# Patient Record
Sex: FEMALE | Race: BLACK OR AFRICAN AMERICAN | URBAN - METROPOLITAN AREA
[De-identification: names, ages, dates, MRNs, and addresses within clinical notes are randomized per-mention and may not be internally consistent; named-entity substitution may affect disease eponyms.]

---

## 2023-06-29 ENCOUNTER — WEB ENCOUNTER (OUTPATIENT)
Dept: URBAN - METROPOLITAN AREA CLINIC 92 | Facility: CLINIC | Age: 63
End: 2023-06-29

## 2023-06-29 ENCOUNTER — DASHBOARD ENCOUNTERS (OUTPATIENT)
Age: 63
End: 2023-06-29

## 2023-06-29 ENCOUNTER — LAB OUTSIDE AN ENCOUNTER (OUTPATIENT)
Dept: URBAN - METROPOLITAN AREA CLINIC 92 | Facility: CLINIC | Age: 63
End: 2023-06-29

## 2023-06-29 ENCOUNTER — OFFICE VISIT (OUTPATIENT)
Dept: URBAN - METROPOLITAN AREA CLINIC 92 | Facility: CLINIC | Age: 63
End: 2023-06-29
Payer: MEDICARE

## 2023-06-29 VITALS
SYSTOLIC BLOOD PRESSURE: 111 MMHG | DIASTOLIC BLOOD PRESSURE: 70 MMHG | HEART RATE: 63 BPM | WEIGHT: 184 LBS | TEMPERATURE: 98.1 F | BODY MASS INDEX: 25.76 KG/M2 | HEIGHT: 71 IN

## 2023-06-29 DIAGNOSIS — Z12.11 COLON CANCER SCREENING: ICD-10-CM

## 2023-06-29 DIAGNOSIS — J44.9 COPD WITHOUT EXACERBATION: ICD-10-CM

## 2023-06-29 PROBLEM — 13645005: Status: ACTIVE | Noted: 2023-06-29

## 2023-06-29 PROCEDURE — 99203 OFFICE O/P NEW LOW 30 MIN: CPT | Performed by: INTERNAL MEDICINE

## 2023-06-29 RX ORDER — AMMONIUM LACTATE 12 G/100G
APPLY TOPICALLY 2 TIMES EVERY DAY AS NEEDED LOTION TOPICAL
Qty: 400 GRAM | Refills: 1 | Status: ACTIVE | COMMUNITY

## 2023-06-29 RX ORDER — OMEPRAZOLE 20 MG/1
CAPSULE, DELAYED RELEASE ORAL
Qty: 90 CAPSULE | Status: ACTIVE | COMMUNITY

## 2023-06-29 RX ORDER — CICLOPIROX OLAMINE 7.7 MG/G
CREAM TOPICAL
Qty: 15 GRAM | Status: ACTIVE | COMMUNITY

## 2023-06-29 RX ORDER — METFORMIN HCL 500 MG/1
TABLET ORAL
Qty: 180 TABLET | Status: ACTIVE | COMMUNITY

## 2023-06-29 RX ORDER — BUSPIRONE HYDROCHLORIDE 15 MG/1
TAKE 1 TABLET BY MOUTH TWICE A DAY TABLET ORAL
Qty: 60 EACH | Refills: 0 | Status: ACTIVE | COMMUNITY

## 2023-06-29 RX ORDER — GABAPENTIN 600 MG/1
TABLET ORAL
Qty: 270 TABLET | Status: ACTIVE | COMMUNITY

## 2023-06-29 RX ORDER — TRAZODONE HYDROCHLORIDE 100 MG/1
TABLET ORAL
Qty: 30 TABLET | Status: ACTIVE | COMMUNITY

## 2023-06-29 RX ORDER — OXYBUTYNIN CHLORIDE 5 MG/1
TAKE 1 TABLET BY MOUTH THREE TIMES A DAY TABLET ORAL
Qty: 90 EACH | Refills: 0 | Status: DISCONTINUED | COMMUNITY

## 2023-06-29 RX ORDER — DAPAGLIFLOZIN 5 MG/1
TABLET, FILM COATED ORAL
Qty: 90 TABLET | Status: ACTIVE | COMMUNITY

## 2023-06-29 RX ORDER — POLYETHYLENE GLYCOL 3350, SODIUM CHLORIDE, SODIUM BICARBONATE, POTASSIUM CHLORIDE 420; 11.2; 5.72; 1.48 G/4L; G/4L; G/4L; G/4L
4000 ML POWDER, FOR SOLUTION ORAL AS DIRECTED
Qty: 4000 ML | Refills: 0 | OUTPATIENT
Start: 2023-06-29 | End: 2023-06-30

## 2023-06-29 RX ORDER — NALOXONE HYDROCHLORIDE 4 MG/.1ML
SPRAY, METERED NASAL
Qty: 2 EACH | Status: ACTIVE | COMMUNITY

## 2023-06-29 RX ORDER — DICLOFENAC SODIUM 10 MG/G
GEL TOPICAL
Qty: 1400 GRAM | Status: ACTIVE | COMMUNITY

## 2023-06-29 RX ORDER — OLANZAPINE 15 MG/1
TABLET, FILM COATED ORAL
Qty: 90 TABLET | Status: ACTIVE | COMMUNITY

## 2023-06-29 RX ORDER — TIOTROPIUM BROMIDE INHALATION SPRAY 3.12 UG/1
TAKE 1 PUFF BY MOUTH TWICE A DAY SPRAY, METERED RESPIRATORY (INHALATION)
Qty: 12 GRAM | Refills: 0 | Status: ACTIVE | COMMUNITY

## 2023-06-29 RX ORDER — METHOCARBAMOL TABLETS 500 MG/1
TAKE 1 TO 2 TABLETS BY MOUTH THREE TIMES DAILY AS NEEDED TABLET, COATED ORAL
Qty: 180 EACH | Refills: 0 | Status: ACTIVE | COMMUNITY

## 2023-06-29 RX ORDER — INSULIN GLARGINE 100 [IU]/ML
INJECT 25 UNITS BELOW THE SKIN AT BEDTIME INJECTION, SOLUTION SUBCUTANEOUS
Qty: 15 MILLILITER | Refills: 3 | Status: ACTIVE | COMMUNITY

## 2023-06-29 RX ORDER — FLUTICASONE PROPIONATE 50 UG/1
SPRAY 1 - 2 SPRAY BY INTRANASAL ROUTE EVERY DAY IN EACH NOSTRIL AS NEEDED SPRAY, METERED NASAL
Qty: 16 GRAM | Refills: 1 | Status: ACTIVE | COMMUNITY

## 2023-06-29 RX ORDER — SOLIFENACIN SUCCINATE 10 MG/1
TABLET, FILM COATED, EXTENDED RELEASE ORAL
Qty: 90 TABLET | Status: ACTIVE | COMMUNITY

## 2023-06-29 RX ORDER — ONDANSETRON 4 MG/1
2 TABLETS TABLET, FILM COATED ORAL
Qty: 2 TABLETS | Refills: 0 | OUTPATIENT
Start: 2023-06-29

## 2023-06-29 RX ORDER — SIMVASTATIN 20 MG/1
TABLET, FILM COATED ORAL
Qty: 90 TABLET | Status: ACTIVE | COMMUNITY

## 2023-06-29 RX ORDER — LISINOPRIL 10 MG/1
TABLET ORAL
Qty: 90 TABLET | Status: ACTIVE | COMMUNITY

## 2023-06-29 RX ORDER — OXYCODONE HYDROCHLORIDE AND ACETAMINOPHEN 10; 325 MG/1; MG/1
TABLET ORAL
Qty: 90 TABLET | Status: ACTIVE | COMMUNITY

## 2023-06-29 RX ORDER — INSULIN ASPART 100 [IU]/ML
INJECT SC 10 UNITS PRE-BREAKFAST, 10 UNITS PRE-LUNCH, AND 10 UNITS PRE-SUPPER INJECTION, SOLUTION INTRAVENOUS; SUBCUTANEOUS
Qty: 15 MILLILITER | Refills: 5 | Status: ACTIVE | COMMUNITY

## 2023-06-29 NOTE — HPI-TODAY'S VISIT:
This is a 62-year-old female referred by Dr Renny Cottrell for GI consultation for colon cancer screening and a copy will be sent to the referring provider.  Upon review of the chart, she had an ERCP done by Dr. Auguste back in 2017 due to elevated transaminases and a suspected bile duct stone.  She had a stent placed at the time and he recommended cholecystectomy. Pt had a colonoscopy in 2012- normal- does not remember where. Pt had her gb out she thinks in 2017 or 2018.  No constipation or diarrhea. NO fam hx of colon cancer. No gerd or anemia. Pt has Cerebral palsy and is disabled.

## 2024-08-08 ENCOUNTER — OFFICE VISIT (OUTPATIENT)
Dept: URBAN - METROPOLITAN AREA CLINIC 92 | Facility: CLINIC | Age: 64
End: 2024-08-08
Payer: MEDICARE

## 2024-08-08 ENCOUNTER — LAB OUTSIDE AN ENCOUNTER (OUTPATIENT)
Dept: URBAN - METROPOLITAN AREA CLINIC 92 | Facility: CLINIC | Age: 64
End: 2024-08-08

## 2024-08-08 DIAGNOSIS — K80.50 CHOLEDOCHOLITHIASIS: ICD-10-CM

## 2024-08-08 DIAGNOSIS — J44.9 COPD WITHOUT EXACERBATION: ICD-10-CM

## 2024-08-08 DIAGNOSIS — Z12.11 COLON CANCER SCREENING: ICD-10-CM

## 2024-08-08 PROCEDURE — 99203 OFFICE O/P NEW LOW 30 MIN: CPT | Performed by: INTERNAL MEDICINE

## 2024-08-08 RX ORDER — OLANZAPINE 15 MG/1
TABLET, FILM COATED ORAL
Qty: 90 TABLET | Status: ACTIVE | COMMUNITY

## 2024-08-08 RX ORDER — DAPAGLIFLOZIN 5 MG/1
TABLET, FILM COATED ORAL
Qty: 90 TABLET | Status: ACTIVE | COMMUNITY

## 2024-08-08 RX ORDER — OMEPRAZOLE 20 MG/1
CAPSULE, DELAYED RELEASE ORAL
Qty: 90 CAPSULE | Status: ACTIVE | COMMUNITY

## 2024-08-08 RX ORDER — CICLOPIROX OLAMINE 7.7 MG/G
CREAM TOPICAL
Qty: 15 GRAM | Status: ACTIVE | COMMUNITY

## 2024-08-08 RX ORDER — INSULIN ASPART 100 [IU]/ML
INJECT SC 10 UNITS PRE-BREAKFAST, 10 UNITS PRE-LUNCH, AND 10 UNITS PRE-SUPPER INJECTION, SOLUTION INTRAVENOUS; SUBCUTANEOUS
Qty: 15 MILLILITER | Refills: 5 | Status: ACTIVE | COMMUNITY

## 2024-08-08 RX ORDER — DICLOFENAC SODIUM 10 MG/G
GEL TOPICAL
Qty: 1400 GRAM | Status: ACTIVE | COMMUNITY

## 2024-08-08 RX ORDER — AMMONIUM LACTATE 12 G/100G
APPLY TOPICALLY 2 TIMES EVERY DAY AS NEEDED LOTION TOPICAL
Qty: 400 GRAM | Refills: 1 | Status: ACTIVE | COMMUNITY

## 2024-08-08 RX ORDER — ONDANSETRON 4 MG/1
2 TABLETS TABLET, FILM COATED ORAL
Qty: 2 TABLETS | Refills: 0 | Status: ACTIVE | COMMUNITY
Start: 2023-06-29

## 2024-08-08 RX ORDER — TRAZODONE HYDROCHLORIDE 100 MG/1
TABLET ORAL
Qty: 30 TABLET | Status: ACTIVE | COMMUNITY

## 2024-08-08 RX ORDER — NALOXONE HYDROCHLORIDE 4 MG/.1ML
SPRAY, METERED NASAL
Qty: 2 EACH | Status: ACTIVE | COMMUNITY

## 2024-08-08 RX ORDER — FLUTICASONE PROPIONATE 50 UG/1
SPRAY 1 - 2 SPRAY BY INTRANASAL ROUTE EVERY DAY IN EACH NOSTRIL AS NEEDED SPRAY, METERED NASAL
Qty: 16 GRAM | Refills: 1 | Status: ACTIVE | COMMUNITY

## 2024-08-08 RX ORDER — METFORMIN HCL 500 MG/1
TABLET ORAL
Qty: 180 TABLET | Status: ACTIVE | COMMUNITY

## 2024-08-08 RX ORDER — OXYCODONE HYDROCHLORIDE AND ACETAMINOPHEN 10; 325 MG/1; MG/1
TABLET ORAL
Qty: 90 TABLET | Status: ACTIVE | COMMUNITY

## 2024-08-08 RX ORDER — SOLIFENACIN SUCCINATE 10 MG/1
TABLET, FILM COATED, EXTENDED RELEASE ORAL
Qty: 90 TABLET | Status: ACTIVE | COMMUNITY

## 2024-08-08 RX ORDER — TIOTROPIUM BROMIDE INHALATION SPRAY 3.12 UG/1
TAKE 1 PUFF BY MOUTH TWICE A DAY SPRAY, METERED RESPIRATORY (INHALATION)
Qty: 12 GRAM | Refills: 0 | Status: ACTIVE | COMMUNITY

## 2024-08-08 RX ORDER — GABAPENTIN 600 MG/1
TABLET ORAL
Qty: 270 TABLET | Status: ACTIVE | COMMUNITY

## 2024-08-08 RX ORDER — INSULIN GLARGINE 100 [IU]/ML
INJECT 25 UNITS BELOW THE SKIN AT BEDTIME INJECTION, SOLUTION SUBCUTANEOUS
Qty: 15 MILLILITER | Refills: 3 | Status: ACTIVE | COMMUNITY

## 2024-08-08 RX ORDER — METHOCARBAMOL TABLETS 500 MG/1
TAKE 1 TO 2 TABLETS BY MOUTH THREE TIMES DAILY AS NEEDED TABLET, COATED ORAL
Qty: 180 EACH | Refills: 0 | Status: ACTIVE | COMMUNITY

## 2024-08-08 RX ORDER — SIMVASTATIN 20 MG/1
TABLET, FILM COATED ORAL
Qty: 90 TABLET | Status: ACTIVE | COMMUNITY

## 2024-08-08 RX ORDER — LISINOPRIL 10 MG/1
TABLET ORAL
Qty: 90 TABLET | Status: ACTIVE | COMMUNITY

## 2024-08-08 RX ORDER — BUSPIRONE HYDROCHLORIDE 15 MG/1
TAKE 1 TABLET BY MOUTH TWICE A DAY TABLET ORAL
Qty: 60 EACH | Refills: 0 | Status: ON HOLD | COMMUNITY

## 2024-09-16 ENCOUNTER — TELEPHONE ENCOUNTER (OUTPATIENT)
Dept: URBAN - METROPOLITAN AREA SURGERY CENTER 16 | Facility: SURGERY CENTER | Age: 64
End: 2024-09-16

## 2024-09-18 ENCOUNTER — WEB ENCOUNTER (OUTPATIENT)
Dept: URBAN - METROPOLITAN AREA CLINIC 92 | Facility: CLINIC | Age: 64
End: 2024-09-18

## 2024-09-20 ENCOUNTER — OFFICE VISIT (OUTPATIENT)
Dept: URBAN - METROPOLITAN AREA SURGERY CENTER 16 | Facility: SURGERY CENTER | Age: 64
End: 2024-09-20

## 2024-11-15 ENCOUNTER — CLAIMS CREATED FROM THE CLAIM WINDOW (OUTPATIENT)
Dept: URBAN - METROPOLITAN AREA CLINIC 4 | Facility: CLINIC | Age: 64
End: 2024-11-15
Payer: MEDICARE

## 2024-11-15 ENCOUNTER — OFFICE VISIT (OUTPATIENT)
Dept: URBAN - METROPOLITAN AREA SURGERY CENTER 16 | Facility: SURGERY CENTER | Age: 64
End: 2024-11-15
Payer: MEDICARE

## 2024-11-15 DIAGNOSIS — D12.3 BENIGN NEOPLASM OF TRANSVERSE COLON: ICD-10-CM

## 2024-11-15 DIAGNOSIS — K64.8 INTERNAL HEMORRHOIDS: ICD-10-CM

## 2024-11-15 DIAGNOSIS — K63.89 OTHER SPECIFIED DISEASES OF INTESTINE: ICD-10-CM

## 2024-11-15 DIAGNOSIS — Z12.11 ENCOUNTER FOR SCREENING FOR MALIGNANT NEOPLASM OF COLON: ICD-10-CM

## 2024-11-15 DIAGNOSIS — Z12.11 COLON CANCER SCREENING: ICD-10-CM

## 2024-11-15 DIAGNOSIS — D12.5 BENIGN NEOPLASM OF SIGMOID COLON: ICD-10-CM

## 2024-11-15 PROCEDURE — 45380 COLONOSCOPY AND BIOPSY: CPT | Performed by: INTERNAL MEDICINE

## 2024-11-15 PROCEDURE — 00811 ANES LWR INTST NDSC NOS: CPT | Performed by: ANESTHESIOLOGY

## 2024-11-15 PROCEDURE — 00811 ANES LWR INTST NDSC NOS: CPT | Performed by: ANESTHESIOLOGIST ASSISTANT

## 2024-11-15 PROCEDURE — 88305 TISSUE EXAM BY PATHOLOGIST: CPT | Performed by: PATHOLOGY

## 2024-11-15 RX ORDER — SIMVASTATIN 20 MG/1
TABLET, FILM COATED ORAL
Qty: 90 TABLET | Status: ACTIVE | COMMUNITY

## 2024-11-15 RX ORDER — GABAPENTIN 600 MG/1
TABLET ORAL
Qty: 270 TABLET | Status: ACTIVE | COMMUNITY

## 2024-11-15 RX ORDER — INSULIN ASPART 100 [IU]/ML
INJECT SC 10 UNITS PRE-BREAKFAST, 10 UNITS PRE-LUNCH, AND 10 UNITS PRE-SUPPER INJECTION, SOLUTION INTRAVENOUS; SUBCUTANEOUS
Qty: 15 MILLILITER | Refills: 5 | Status: ACTIVE | COMMUNITY

## 2024-11-15 RX ORDER — DICLOFENAC SODIUM 10 MG/G
GEL TOPICAL
Qty: 1400 GRAM | Status: ACTIVE | COMMUNITY

## 2024-11-15 RX ORDER — BUSPIRONE HYDROCHLORIDE 15 MG/1
TAKE 1 TABLET BY MOUTH TWICE A DAY TABLET ORAL
Qty: 60 EACH | Refills: 0 | Status: ON HOLD | COMMUNITY

## 2024-11-15 RX ORDER — CICLOPIROX OLAMINE 7.7 MG/G
CREAM TOPICAL
Qty: 15 GRAM | Status: ACTIVE | COMMUNITY

## 2024-11-15 RX ORDER — DAPAGLIFLOZIN 5 MG/1
TABLET, FILM COATED ORAL
Qty: 90 TABLET | Status: ACTIVE | COMMUNITY

## 2024-11-15 RX ORDER — TIOTROPIUM BROMIDE INHALATION SPRAY 3.12 UG/1
TAKE 1 PUFF BY MOUTH TWICE A DAY SPRAY, METERED RESPIRATORY (INHALATION)
Qty: 12 GRAM | Refills: 0 | Status: ACTIVE | COMMUNITY

## 2024-11-15 RX ORDER — LISINOPRIL 10 MG/1
TABLET ORAL
Qty: 90 TABLET | Status: ACTIVE | COMMUNITY

## 2024-11-15 RX ORDER — OMEPRAZOLE 20 MG/1
CAPSULE, DELAYED RELEASE ORAL
Qty: 90 CAPSULE | Status: ACTIVE | COMMUNITY

## 2024-11-15 RX ORDER — TRAZODONE HYDROCHLORIDE 100 MG/1
TABLET ORAL
Qty: 30 TABLET | Status: ACTIVE | COMMUNITY

## 2024-11-15 RX ORDER — ONDANSETRON 4 MG/1
2 TABLETS TABLET, FILM COATED ORAL
Qty: 2 TABLETS | Refills: 0 | Status: ACTIVE | COMMUNITY
Start: 2023-06-29

## 2024-11-15 RX ORDER — AMMONIUM LACTATE 12 G/100G
APPLY TOPICALLY 2 TIMES EVERY DAY AS NEEDED LOTION TOPICAL
Qty: 400 GRAM | Refills: 1 | Status: ACTIVE | COMMUNITY

## 2024-11-15 RX ORDER — INSULIN GLARGINE 100 [IU]/ML
INJECT 25 UNITS BELOW THE SKIN AT BEDTIME INJECTION, SOLUTION SUBCUTANEOUS
Qty: 15 MILLILITER | Refills: 3 | Status: ACTIVE | COMMUNITY

## 2024-11-15 RX ORDER — OXYCODONE HYDROCHLORIDE AND ACETAMINOPHEN 10; 325 MG/1; MG/1
TABLET ORAL
Qty: 90 TABLET | Status: ACTIVE | COMMUNITY

## 2024-11-15 RX ORDER — NALOXONE HYDROCHLORIDE 4 MG/.1ML
SPRAY NASAL
Qty: 2 EACH | Status: ACTIVE | COMMUNITY

## 2024-11-15 RX ORDER — SOLIFENACIN SUCCINATE 10 MG/1
TABLET, FILM COATED, EXTENDED RELEASE ORAL
Qty: 90 TABLET | Status: ACTIVE | COMMUNITY

## 2024-11-15 RX ORDER — METFORMIN HCL 500 MG/1
TABLET ORAL
Qty: 180 TABLET | Status: ACTIVE | COMMUNITY

## 2024-11-15 RX ORDER — METHOCARBAMOL TABLETS 500 MG/1
TAKE 1 TO 2 TABLETS BY MOUTH THREE TIMES DAILY AS NEEDED TABLET, COATED ORAL
Qty: 180 EACH | Refills: 0 | Status: ACTIVE | COMMUNITY

## 2024-11-15 RX ORDER — FLUTICASONE PROPIONATE 50 UG/1
SPRAY 1 - 2 SPRAY BY INTRANASAL ROUTE EVERY DAY IN EACH NOSTRIL AS NEEDED SPRAY, METERED NASAL
Qty: 16 GRAM | Refills: 1 | Status: ACTIVE | COMMUNITY

## 2024-11-15 RX ORDER — OLANZAPINE 15 MG/1
TABLET, FILM COATED ORAL
Qty: 90 TABLET | Status: ACTIVE | COMMUNITY